# Patient Record
Sex: MALE | Race: WHITE | NOT HISPANIC OR LATINO | Employment: OTHER | URBAN - METROPOLITAN AREA
[De-identification: names, ages, dates, MRNs, and addresses within clinical notes are randomized per-mention and may not be internally consistent; named-entity substitution may affect disease eponyms.]

---

## 2019-11-14 ENCOUNTER — OFFICE VISIT (OUTPATIENT)
Dept: GASTROENTEROLOGY | Facility: MEDICAL CENTER | Age: 64
End: 2019-11-14
Payer: COMMERCIAL

## 2019-11-14 VITALS
TEMPERATURE: 98.7 F | BODY MASS INDEX: 29.03 KG/M2 | WEIGHT: 196 LBS | HEART RATE: 68 BPM | HEIGHT: 69 IN | SYSTOLIC BLOOD PRESSURE: 130 MMHG | DIASTOLIC BLOOD PRESSURE: 70 MMHG

## 2019-11-14 DIAGNOSIS — Z12.11 COLON CANCER SCREENING: Primary | ICD-10-CM

## 2019-11-14 PROCEDURE — 99203 OFFICE O/P NEW LOW 30 MIN: CPT | Performed by: INTERNAL MEDICINE

## 2019-11-14 RX ORDER — SPIRONOLACTONE 25 MG/1
25 TABLET ORAL DAILY
COMMUNITY

## 2019-11-14 RX ORDER — ASPIRIN 81 MG/1
81 TABLET ORAL
COMMUNITY

## 2019-11-14 RX ORDER — NITROGLYCERIN 0.4 MG/1
0.4 TABLET SUBLINGUAL
COMMUNITY

## 2019-11-14 RX ORDER — LISINOPRIL 5 MG/1
20 TABLET ORAL
COMMUNITY

## 2019-11-14 RX ORDER — ATORVASTATIN CALCIUM 40 MG/1
40 TABLET, FILM COATED ORAL
COMMUNITY

## 2019-11-14 RX ORDER — CARVEDILOL 25 MG/1
3.12 TABLET ORAL 2 TIMES DAILY WITH MEALS
COMMUNITY

## 2019-11-14 NOTE — PROGRESS NOTES
[facility] - Outpatient Consultation  Rachele Eaton 71 y o  male MRN: [de-identified]  Encounter: [id number]          ASSESSMENT AND PLAN:      1  Colon cancer screening  Patient is due for colon cancer screening  Patient was counseled on the benefits and risks of endoscopic intervention including but not limited to bleeding, infection, bowel perforation  Patient also understands colonoscopy is not 100% sensitive to detect polyps  - polyethylene glycol (GOLYTELY) 4000 mL solution; Take 4,000 mL by mouth once for 1 dose  Dispense: 4000 mL; Refill: 0  - bisacodyl (DULCOLAX) 5 mg EC tablet; Take 2 tablets (10 mg total) by mouth once for 1 dose  Dispense: 2 tablet; Refill: 0  - Colonoscopy; Future    ______________________________________________________________________    HPI:      39-year-old man with past medical history of hyperlipidemia referred by [facility] for evaluation of colon cancer screening  Patient never had a colonoscopy before  He reported regular formed bowel movement  Denies blood in the stool  Denies abdominal pain  Denies family history of colon cancer  He quit smoking a year ago  Denies alcohol abuse  Patient reported intermittent acid reflux but his symptoms only triggered by [place] food  He eats [place] food maybe once a year and takes medication for acid reflux at that time  Otherwise he is asymptomatic  REVIEW OF SYSTEMS:    CONSTITUTIONAL: Denies any fever, chills, rigors, and weight loss  HEENT: No earache or tinnitus  Denies hearing loss or visual disturbances  CARDIOVASCULAR: No chest pain or palpitations  RESPIRATORY: Denies any cough, hemoptysis, shortness of breath or dyspnea on exertion  GASTROINTESTINAL: As noted in the History of Present Illness  GENITOURINARY: No problems with urination  Denies any hematuria or dysuria  NEUROLOGIC: No dizziness or vertigo, denies headaches  MUSCULOSKELETAL: Denies any muscle or joint pain  SKIN: Denies skin rashes or itching  ENDOCRINE: Denies excessive thirst  Denies intolerance to heat or cold  PSYCHOSOCIAL: Denies depression or anxiety  Denies any recent memory loss  Historical Information   Past Medical History:   Diagnosis Date    Arthritis     Hyperlipidemia     Hypertension      Past Surgical History:   Procedure Laterality Date    CARDIAC SURGERY       Social History   Social History     Substance and Sexual Activity   Alcohol Use Yes    Frequency: Monthly or less     Social History     Substance and Sexual Activity   Drug Use Not Currently     Social History     Tobacco Use   Smoking Status Former Smoker   Smokeless Tobacco Never Used     Family History   Problem Relation Age of Onset    Cancer Brother        Meds/Allergies       Current Outpatient Medications:     aspirin (ECOTRIN LOW STRENGTH) 81 mg EC tablet    atorvastatin (LIPITOR) 40 mg tablet    carvedilol (COREG) 25 mg tablet    lisinopril (ZESTRIL) 5 mg tablet    nitroglycerin (NITROSTAT) 0 4 mg SL tablet    spironolactone (ALDACTONE) 25 mg tablet    bisacodyl (DULCOLAX) 5 mg EC tablet    polyethylene glycol (GOLYTELY) 4000 mL solution    No Known Allergies        Objective     Blood pressure 130/70, pulse 68, temperature 98 7 °F (37 1 °C), temperature source Tympanic, height 5' 9" (1 753 m), weight 88 9 kg (196 lb)  Body mass index is 28 94 kg/m²  PHYSICAL EXAM:      General Appearance:   Alert, cooperative, no distress   HEENT:   Normocephalic, atraumatic, anicteric      Neck:  Supple, symmetrical, trachea midline   Lungs:   Clear to auscultation bilaterally; no rales, rhonchi or wheezing; respirations unlabored    Heart[de-identified]   Regular rate and rhythm; no murmur, rub, or gallop     Abdomen:   Soft, non-tender, non-distended; normal bowel sounds; no masses, no organomegaly    Genitalia:   Deferred    Rectal:   Deferred    Extremities:  No cyanosis, clubbing or edema    Pulses:  2+ and symmetric    Skin:  No jaundice, rashes, or lesions    Lymph nodes:  No palpable cervical lymphadenopathy        Lab Results:   No visits with results within 1 Day(s) from this visit  Latest known visit with results is:   No results found for any previous visit  Radiology Results:   No results found

## 2019-11-14 NOTE — PATIENT INSTRUCTIONS
Patient is scheduled for procedure 12/09/19 with Dr Marie Piña  He was given Golytely/Dulcolax prep   He voiced understanding to all instructions and is aware he needs a ride

## 2019-11-15 ENCOUNTER — ANESTHESIA EVENT (OUTPATIENT)
Dept: GASTROENTEROLOGY | Facility: HOSPITAL | Age: 64
End: 2019-11-15

## 2019-11-19 ENCOUNTER — TELEPHONE (OUTPATIENT)
Dept: GASTROENTEROLOGY | Facility: MEDICAL CENTER | Age: 64
End: 2019-11-19

## 2019-11-19 NOTE — TELEPHONE ENCOUNTER
Pt is rescheduled to hospital setting per anesthesia I moved pt to 12/24/19 at [facility] with dr Natasha Deng  Patient is aware he will need a  to and from   he will get a call the day before with an exact time for arrival

## 2019-12-09 ENCOUNTER — ANESTHESIA (OUTPATIENT)
Dept: GASTROENTEROLOGY | Facility: HOSPITAL | Age: 64
End: 2019-12-09

## 2019-12-20 NOTE — PRE-PROCEDURE INSTRUCTIONS
Pre-Surgery Instructions:   Medication Instructions    carvedilol (COREG) 25 mg tablet Instructed patient per Anesthesia Guidelines   lisinopril (ZESTRIL) 5 mg tablet Instructed patient per Anesthesia Guidelines

## 2019-12-24 ENCOUNTER — HOSPITAL ENCOUNTER (OUTPATIENT)
Dept: GASTROENTEROLOGY | Facility: HOSPITAL | Age: 64
Setting detail: OUTPATIENT SURGERY
Discharge: HOME | End: 2019-12-24
Attending: INTERNAL MEDICINE | Admitting: INTERNAL MEDICINE
Payer: OTHER GOVERNMENT

## 2019-12-24 VITALS
DIASTOLIC BLOOD PRESSURE: 77 MMHG | WEIGHT: 190 LBS | TEMPERATURE: 97.9 F | HEART RATE: 58 BPM | OXYGEN SATURATION: 92 % | RESPIRATION RATE: 16 BRPM | HEIGHT: 69 IN | BODY MASS INDEX: 28.14 KG/M2 | SYSTOLIC BLOOD PRESSURE: 149 MMHG

## 2019-12-24 DIAGNOSIS — Z12.11 COLON CANCER SCREENING: ICD-10-CM

## 2019-12-24 PROCEDURE — 45380 COLONOSCOPY AND BIOPSY: CPT | Performed by: INTERNAL MEDICINE

## 2019-12-24 PROCEDURE — 88305 TISSUE EXAM BY PATHOLOGIST: CPT | Performed by: PATHOLOGY

## 2019-12-24 RX ORDER — SODIUM CHLORIDE 9 MG/ML
125 INJECTION, SOLUTION INTRAVENOUS CONTINUOUS
Status: DISCONTINUED | OUTPATIENT
Start: 2019-12-24 | End: 2019-12-28 | Stop reason: HOSPADM

## 2019-12-24 RX ORDER — SODIUM CHLORIDE 9 MG/ML
INJECTION, SOLUTION INTRAVENOUS CONTINUOUS PRN
Status: DISCONTINUED | OUTPATIENT
Start: 2019-12-24 | End: 2019-12-24 | Stop reason: SURG

## 2019-12-24 RX ORDER — PROPOFOL 10 MG/ML
INJECTION, EMULSION INTRAVENOUS AS NEEDED
Status: DISCONTINUED | OUTPATIENT
Start: 2019-12-24 | End: 2019-12-24 | Stop reason: SURG

## 2019-12-24 RX ADMIN — PROPOFOL 50 MG: 10 INJECTION, EMULSION INTRAVENOUS at 13:30

## 2019-12-24 RX ADMIN — PROPOFOL 50 MG: 10 INJECTION, EMULSION INTRAVENOUS at 13:16

## 2019-12-24 RX ADMIN — PROPOFOL 50 MG: 10 INJECTION, EMULSION INTRAVENOUS at 13:17

## 2019-12-24 RX ADMIN — PROPOFOL 50 MG: 10 INJECTION, EMULSION INTRAVENOUS at 13:21

## 2019-12-24 RX ADMIN — PROPOFOL 50 MG: 10 INJECTION, EMULSION INTRAVENOUS at 13:24

## 2019-12-24 RX ADMIN — PROPOFOL 50 MG: 10 INJECTION, EMULSION INTRAVENOUS at 13:18

## 2019-12-24 RX ADMIN — SODIUM CHLORIDE: 0.9 INJECTION, SOLUTION INTRAVENOUS at 12:20

## 2019-12-24 RX ADMIN — PROPOFOL 50 MG: 10 INJECTION, EMULSION INTRAVENOUS at 13:27

## 2019-12-24 NOTE — ANESTHESIA PREPROCEDURE EVALUATION
Review of Systems/Medical History  Patient summary reviewed  Chart reviewed      Cardiovascular  EKG reviewed, Exercise tolerance (METS): <4,  Hyperlipidemia, Hypertension controlled, CAD , CAD status: obstructive, History of CABG, No cardiac stents  No history of percutaneous transluminal coronary angioplasty, No CHF , No PVD,   Comment: Coreg this AM,  Pulmonary  Smoker ex-smoker  , No COPD , No asthma , No sleep apnea (never studied) ,        GI/Hepatic    GERD well controlled, Bowel prep       Negative  ROS        Endo/Other  Negative endo/other ROS   No obesity    GYN       Hematology  Anemia anemia of chronic disease,     Musculoskeletal  No back pain ,   Arthritis     Neurology  Negative neurology ROS      Psychology   Negative psychology ROS              Physical Exam    Airway    Mallampati score: II  TM Distance: >3 FB  Neck ROM: limited     Dental   upper dentures and lower dentures,     Cardiovascular  Cardiovascular exam normal    Pulmonary  Pulmonary exam normal     Other Findings  Fixed upper and lower teeth and in good repair      Anesthesia Plan  ASA Score- 3     Anesthesia Type- IV sedation with anesthesia with ASA Monitors  Additional Monitors:   Airway Plan:         Plan Factors-Patient not instructed to abstain from smoking on day of procedure  Patient did not smoke on day of surgery  Induction- intravenous  Postoperative Plan-     Informed Consent- Anesthetic plan and risks discussed with patient  I personally reviewed this patient with the CRNA  Discussed and agreed on the Anesthesia Plan with the CRNA  Grover Sauceda

## 2019-12-24 NOTE — H&P
History and Physical -  [facility]  Monik Stokes 71 y o  male MRN: [de-identified]                  HPI: Monik Stokes is a 71y o  year old male who presents for colon cancer screening      REVIEW OF SYSTEMS: Per the HPI, and otherwise unremarkable  Historical Information   Past Medical History:   Diagnosis Date    Anemia     B12 def    Arthritis     Coronary artery disease     GERD (gastroesophageal reflux disease)     on occasion     H/O hemorrhoidectomy     Hyperlipidemia     Hypertension      Past Surgical History:   Procedure Laterality Date    CARDIAC SURGERY      CORONARY ARTERY BYPASS GRAFT  2002     Social History   Social History     Substance and Sexual Activity   Alcohol Use Yes    Frequency: Monthly or less     Social History     Substance and Sexual Activity   Drug Use Not Currently     Social History     Tobacco Use   Smoking Status Former Smoker   Smokeless Tobacco Never Used     Family History   Problem Relation Age of Onset    Cancer Brother     Diabetes Mother     Heart disease Father        Meds/Allergies       (Not in a hospital admission)    No Known Allergies    Objective     /84   Pulse 60   Temp 97 6 °F (36 4 °C) (Temporal)   Resp 20   Ht 5' 9" (1 753 m)   Wt 86 2 kg (190 lb)   SpO2 95%   BMI 28 06 kg/m²       PHYSICAL EXAM    Gen: NAD  CV: RRR  CHEST: Clear  ABD: soft, NT/ND  EXT: no edema      ASSESSMENT/PLAN:  This is a 71y o  year old male here for colon cancer screening, and he is stable and optimized for his procedure

## 2019-12-24 NOTE — NURSING NOTE
Pt is awake,alert,ambulatory,tolerated diet,seen and instructed by Dr Jose Manuel Colon understands all instructions and has no complaints

## 2021-03-29 ENCOUNTER — IMMUNIZATIONS (OUTPATIENT)
Dept: FAMILY MEDICINE CLINIC | Facility: HOSPITAL | Age: 66
End: 2021-03-29

## 2021-03-29 DIAGNOSIS — Z23 ENCOUNTER FOR IMMUNIZATION: Primary | ICD-10-CM

## 2021-03-29 PROCEDURE — 0011A SARS-COV-2 / COVID-19 MRNA VACCINE (MODERNA) 100 MCG: CPT

## 2021-03-29 PROCEDURE — 91301 SARS-COV-2 / COVID-19 MRNA VACCINE (MODERNA) 100 MCG: CPT

## 2021-04-30 ENCOUNTER — IMMUNIZATIONS (OUTPATIENT)
Dept: FAMILY MEDICINE CLINIC | Facility: HOSPITAL | Age: 66
End: 2021-04-30

## 2021-04-30 DIAGNOSIS — Z23 ENCOUNTER FOR IMMUNIZATION: Primary | ICD-10-CM

## 2021-04-30 PROCEDURE — 0012A SARS-COV-2 / COVID-19 MRNA VACCINE (MODERNA) 100 MCG: CPT

## 2021-04-30 PROCEDURE — 91301 SARS-COV-2 / COVID-19 MRNA VACCINE (MODERNA) 100 MCG: CPT

## 2023-01-09 ENCOUNTER — TELEPHONE (OUTPATIENT)
Dept: GASTROENTEROLOGY | Facility: CLINIC | Age: 68
End: 2023-01-09

## 2023-01-09 NOTE — TELEPHONE ENCOUNTER
LMOM for patient to call back to schedule appointment       [address] [facility] Referral [id number]  Z12 11 (ICD-10-CM) - Encounter for screening for malignant neoplasm of colon

## 2023-02-08 ENCOUNTER — CONSULT (OUTPATIENT)
Dept: GASTROENTEROLOGY | Facility: MEDICAL CENTER | Age: 68
End: 2023-02-08

## 2023-02-08 VITALS
SYSTOLIC BLOOD PRESSURE: 111 MMHG | BODY MASS INDEX: 32.72 KG/M2 | HEART RATE: 62 BPM | DIASTOLIC BLOOD PRESSURE: 74 MMHG | WEIGHT: 221.6 LBS | TEMPERATURE: 97.4 F

## 2023-02-08 DIAGNOSIS — Z12.11 COLON CANCER SCREENING: Primary | ICD-10-CM

## 2023-02-08 NOTE — PROGRESS NOTES
[facility] - Outpatient Consultation  Feliberto Landau 70 y o  male MRN: [de-identified]  Encounter: [id number]          ASSESSMENT AND PLAN:      1  Colon cancer screening: Last colonoscopy was in January 2020, he had 5 small polyps removed, diverticulosis, small internal hemorrhoids  One of his biopsies revealed tubular adenoma  Repeat colonoscopy is recommended in 5 years  He is 2 years early for his repeat screening  He would like to hold off until his 5-year recommendation  -Colonoscopy reminder for January 2025    ______________________________________________________________________    HPI:  Feliberto Landau is a 58-year-old male past medical history hypertension, hyperlipidemia, CAD here for colon cancer screening from the [facility]   His last colonoscopy was in January 2020  He had some polyps removed, 1 of which was a tubular adenoma  Repeat colonoscopy is recommended in 5 years  He will be due in January 2025  He denies any GI symptoms or alarm symptoms today  REVIEW OF SYSTEMS:    CONSTITUTIONAL: Denies any fever, chills, rigors, and weight loss  HEENT: No earache or tinnitus  Denies hearing loss or visual disturbances  CARDIOVASCULAR: No chest pain or palpitations  RESPIRATORY: Denies any cough, hemoptysis, shortness of breath or dyspnea on exertion  GASTROINTESTINAL: As noted in the History of Present Illness  GENITOURINARY: No problems with urination  Denies any hematuria or dysuria  NEUROLOGIC: No dizziness or vertigo, denies headaches  MUSCULOSKELETAL: Denies any muscle or joint pain  SKIN: Denies skin rashes or itching  ENDOCRINE: Denies excessive thirst  Denies intolerance to heat or cold  PSYCHOSOCIAL: Denies depression or anxiety  Denies any recent memory loss         Historical Information   Past Medical History:   Diagnosis Date   • Anemia     B12 def   • Arthritis    • Coronary artery disease    • GERD (gastroesophageal reflux disease) on occasion    • H/O hemorrhoidectomy    • Hyperlipidemia    • Hypertension      Past Surgical History:   Procedure Laterality Date   • CARDIAC SURGERY     • CORONARY ARTERY BYPASS GRAFT  2002     Social History   Social History     Substance and Sexual Activity   Alcohol Use Yes     Social History     Substance and Sexual Activity   Drug Use Not Currently     Social History     Tobacco Use   Smoking Status Former   Smokeless Tobacco Never     Family History   Problem Relation Age of Onset   • Cancer Brother    • Diabetes Mother    • Heart disease Father        Meds/Allergies       Current Outpatient Medications:   •  aspirin (ECOTRIN LOW STRENGTH) 81 mg EC tablet  •  atorvastatin (LIPITOR) 40 mg tablet  •  carvedilol (COREG) 25 mg tablet  •  lisinopril (ZESTRIL) 5 mg tablet  •  nitroglycerin (NITROSTAT) 0 4 mg SL tablet  •  spironolactone (ALDACTONE) 25 mg tablet  •  bisacodyl (DULCOLAX) 5 mg EC tablet  •  polyethylene glycol (GOLYTELY) 4000 mL solution    No Known Allergies        Objective     Blood pressure 111/74, pulse 62, temperature (!) 97 4 °F (36 3 °C), temperature source Tympanic, weight 101 kg (221 lb 9 6 oz)  Body mass index is 32 72 kg/m²  PHYSICAL EXAM:      General Appearance:   Alert, cooperative, no distress   HEENT:   Normocephalic, atraumatic, anicteric      Neck:  Supple, symmetrical, trachea midline   Lungs:   Clear to auscultation bilaterally; no rales, rhonchi or wheezing; respirations unlabored    Heart[de-identified]   Regular rate and rhythm; no murmur, rub, or gallop  Abdomen:   Soft, non-tender, non-distended; normal bowel sounds; no masses, no organomegaly    Genitalia:   Deferred    Rectal:   Deferred    Extremities:  No cyanosis, clubbing or edema        Skin:  No jaundice, rashes, or lesions          Lab Results:   No visits with results within 1 Day(s) from this visit     Latest known visit with results is:   Hospital Outpatient Visit on 12/24/2019   Component Date Value   • Case Report 12/24/2019                      Value:Surgical Pathology Report                         Case: [id number]                                   Authorizing Provider:  Olimpia Anderson MD              Collected:           12/24/2019 1325              Ordering Location:     [facility] Received:            12/24/2019 1453                                     [facility]                                                              Pathologist:           Shawna Acosta MD                                                              Specimens:   A) - Large Intestine, Cecum, cecal polyp                                                            B) - Large Intestine, Transverse Colon, transverse colon polyp                                      C) - Large Intestine, Sigmoid Colon, sigmoid polyp                                        • Final Diagnosis 12/24/2019                      Value: This result contains rich text formatting which cannot be displayed here  • Note 12/24/2019                      Value: This result contains rich text formatting which cannot be displayed here  • Additional Information 12/24/2019                      Value: This result contains rich text formatting which cannot be displayed here  • Synoptic Checklist 12/24/2019                      Value:  (COLON/RECTUM POLYP FORM-GI - All Specimens)                                                                                                                 : Adenoma(s)     • Gross Description 12/24/2019                      Value: This result contains rich text formatting which cannot be displayed here  Radiology Results:   No results found

## 2024-09-06 ENCOUNTER — TELEPHONE (OUTPATIENT)
Dept: GASTROENTEROLOGY | Facility: MEDICAL CENTER | Age: 69
End: 2024-09-06

## 2025-08-11 ENCOUNTER — PREP FOR PROCEDURE (OUTPATIENT)
Age: 70
End: 2025-08-11

## 2025-08-11 ENCOUNTER — TELEPHONE (OUTPATIENT)
Age: 70
End: 2025-08-11